# Patient Record
Sex: MALE | Race: WHITE | URBAN - METROPOLITAN AREA
[De-identification: names, ages, dates, MRNs, and addresses within clinical notes are randomized per-mention and may not be internally consistent; named-entity substitution may affect disease eponyms.]

---

## 2020-02-17 ENCOUNTER — OFFICE VISIT (OUTPATIENT)
Dept: PULMONOLOGY | Age: 40
End: 2020-02-17
Payer: MEDICARE

## 2020-02-17 VITALS
BODY MASS INDEX: 45.1 KG/M2 | OXYGEN SATURATION: 93 % | WEIGHT: 315 LBS | RESPIRATION RATE: 16 BRPM | DIASTOLIC BLOOD PRESSURE: 84 MMHG | HEIGHT: 70 IN | SYSTOLIC BLOOD PRESSURE: 132 MMHG

## 2020-02-17 PROCEDURE — G8484 FLU IMMUNIZE NO ADMIN: HCPCS | Performed by: INTERNAL MEDICINE

## 2020-02-17 PROCEDURE — G8417 CALC BMI ABV UP PARAM F/U: HCPCS | Performed by: INTERNAL MEDICINE

## 2020-02-17 PROCEDURE — 4004F PT TOBACCO SCREEN RCVD TLK: CPT | Performed by: INTERNAL MEDICINE

## 2020-02-17 PROCEDURE — 99204 OFFICE O/P NEW MOD 45 MIN: CPT | Performed by: INTERNAL MEDICINE

## 2020-02-17 PROCEDURE — G8427 DOCREV CUR MEDS BY ELIG CLIN: HCPCS | Performed by: INTERNAL MEDICINE

## 2020-02-17 RX ORDER — PALIPERIDONE 6 MG/1
6 TABLET, EXTENDED RELEASE ORAL EVERY MORNING
COMMUNITY

## 2020-02-17 RX ORDER — DEXTROAMPHETAMINE SACCHARATE, AMPHETAMINE ASPARTATE, DEXTROAMPHETAMINE SULFATE AND AMPHETAMINE SULFATE 5; 5; 5; 5 MG/1; MG/1; MG/1; MG/1
20 TABLET ORAL DAILY
COMMUNITY

## 2020-02-17 RX ORDER — LISINOPRIL 20 MG/1
20 TABLET ORAL DAILY
COMMUNITY

## 2020-02-17 RX ORDER — BUPRENORPHINE AND NALOXONE 8; 2 MG/1; MG/1
1 FILM, SOLUBLE BUCCAL; SUBLINGUAL DAILY
COMMUNITY

## 2020-02-17 RX ORDER — SERTRALINE HYDROCHLORIDE 100 MG/1
100 TABLET, FILM COATED ORAL DAILY
COMMUNITY

## 2020-02-17 RX ORDER — MONTELUKAST SODIUM 10 MG/1
10 TABLET ORAL NIGHTLY
COMMUNITY

## 2020-02-17 RX ORDER — PRAZOSIN HYDROCHLORIDE 2 MG/1
2 CAPSULE ORAL NIGHTLY
COMMUNITY

## 2020-02-17 SDOH — HEALTH STABILITY: MENTAL HEALTH: HOW OFTEN DO YOU HAVE A DRINK CONTAINING ALCOHOL?: NEVER
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
## 2020-03-26 ENCOUNTER — TELEPHONE (OUTPATIENT)
Dept: PULMONOLOGY | Age: 40
End: 2020-03-26

## 2020-03-26 NOTE — TELEPHONE ENCOUNTER
Patient mother Clyde Purcell) lvm wanting to speak with Dr. Alycia Gardner in reference to overnight pulse ox  Can be reached at [phone]

## 2020-03-27 NOTE — TELEPHONE ENCOUNTER
I need a little clarity on his study. The study was done on room air, with his bipap and showed he drops his sats to below 89% for 130 minutes. To me, that justifies putting him on O2. However, the study say in the comments that this doesn't qualify him for oxygen because the study wasn't done in a monitored setting! Makes no sense. I would like to get him on 2L NC at night and challenge that interpretation.

## 2020-04-13 ENCOUNTER — TELEPHONE (OUTPATIENT)
Dept: PULMONOLOGY | Age: 40
End: 2020-04-13

## 2020-04-15 ENCOUNTER — TELEPHONE (OUTPATIENT)
Dept: PULMONOLOGY | Age: 40
End: 2020-04-15

## 2020-04-21 ENCOUNTER — TELEPHONE (OUTPATIENT)
Dept: PULMONOLOGY | Age: 40
End: 2020-04-21

## 2020-04-22 NOTE — TELEPHONE ENCOUNTER
Pt's mom called back to let us know that she spoke to [facility] and as of now due to Covid pt is not required to do a home sleep study. [facility] is in the process of sending claim back to Medicare in hopes they will pay for O2 now. She will call back with any other updates.

## 2020-05-13 ENCOUNTER — VIRTUAL VISIT (OUTPATIENT)
Dept: PULMONOLOGY | Age: 40
End: 2020-05-13
Payer: MEDICARE

## 2020-05-13 VITALS — HEIGHT: 70 IN | WEIGHT: 315 LBS | BODY MASS INDEX: 45.1 KG/M2

## 2020-05-13 PROBLEM — G47.36 NOCTURNAL HYPOXEMIA DUE TO OBESITY: Status: ACTIVE | Noted: 2020-05-13

## 2020-05-13 PROBLEM — E66.9 NOCTURNAL HYPOXEMIA DUE TO OBESITY: Status: ACTIVE | Noted: 2020-05-13

## 2020-05-13 PROCEDURE — G8427 DOCREV CUR MEDS BY ELIG CLIN: HCPCS | Performed by: INTERNAL MEDICINE

## 2020-05-13 PROCEDURE — 99213 OFFICE O/P EST LOW 20 MIN: CPT | Performed by: INTERNAL MEDICINE

## 2020-05-13 RX ORDER — PALIPERIDONE 3 MG/1
TABLET, EXTENDED RELEASE ORAL
COMMUNITY
Start: 2020-05-04

## 2020-05-13 RX ORDER — CARIPRAZINE 3 MG/1
CAPSULE, GELATIN COATED ORAL
COMMUNITY
Start: 2020-05-08

## 2020-05-13 NOTE — PROGRESS NOTES
Pulmonology Video Visit    Pursuant to the emergency declaration under the [address] 1135 waiver authority and the Coronavirus Preparedness and [facility] Appropriations Act this Video Visit was insisted, with patient's consent, to reduce the patient's risk of exposure to COVID-19 and provide continuity of care for an established patient. The patient was at home, while the provider was at the clinic. Services were provided through a synchronous discussion through a Video Visit to substitute for in-person clinic visit, and coded as such. [facility]    Outpatient Follow Up Note    Subjective:   CHIEF COMPLAINT / HPI:     The patient is 35 y.o. male who presents today follow up of nocturnal hypoxemia. Monserrat Carnes had an pulse ox study since last visit that showed that he did desaturate even on his BiPAP. I wrote a prescription for him to get 2 L of oxygen at night he does not notice much difference, but does note that he is more tired than he used to be and sometimes sleeps up to 18 hours a day. He is also had several changes in his psychotropic medications that might account for this. At the time he had his overnight pulse ox study his heart rate got significantly high so I recommended he talk to his primary care doctor about a possible Holter monitor. Apparently the Holter monitor was ordered but he was too afraid to go get it because of coronavirus. In the interim they are going to try to use his mother's apple watch which can monitor heart rate and rhythm. He needs to be compliant with his BiPAP but acknowledges that he is not physically active enough. Initial visit:  Patient is accompanied by his mother with whom he lives in her basement. He has a history of obstructive sleep apnea and is on a BiPAP with a liter and a half of oxygen bleed in.   His sleep physician suggest that he see a pulmonologist because of the smoking cessation were discussed in detail. More than half the time of this 23 minute visit was spent counseling the patient. RTC 6 months w/ MD. Call or RTC sooner if symptoms persist or worsen acutely.       Denver Curly